# Patient Record
(demographics unavailable — no encounter records)

---

## 2025-02-22 NOTE — REASON FOR VISIT
[FreeTextEntry1] : ======================================================================================= Diagnostic Tests: -------------------------------------------------------------- EC24: sinus bradycardia at 58 bpm, otherwise normal.  23: sinus bradycardia at 52 bpm, APCs.  22: sinus bradycardia at 57 bpm, possible old anterior MI.  -------------------------------------------------------------- Stress tests: 10/25/22: exercise stress echo: EF 63%, normal wall motion, PA pressures, and ECG post 4.6 METs of exercise (nondiagnostic secondary to achieving 67% MPHR).  -------------------------------------------------------------- Lower extremity veins: 23: sono: no DVT bilaterally, + RGSV nonocclusive thrombus, + RGVS and RSFJ reflux, + R anteromedial varicose veins.

## 2025-02-22 NOTE — ASSESSMENT
[FreeTextEntry1] : ======================================================================================= 1. Dyslipidemia:  (12/26/23): she discontinued rosuvastatin 40mg and is currently taking supplements instead:   (06/25/24):  - discussed therapeutic lifestyle changes to promote improved lipid metabolism  - check repeat lipid profile today  2. Pre-DM2: A1c 5.8% (06/25/24):  - discussed with patient therapeutic lifestyle changes to improve glucose metabolism  - check repeat A1c today  3. Chest pain: mixed typical and atypical features: s/p normal sub max stress echo 10/25/22: resolved:   - will manage conservatively at this time  4. Palpitations: likely secondary to ectopy: resolved:   - will order a 2-week MCT monitor ([employer]) if symptoms recur  5. Chronic venous insufficiency: s/p multiple DVTs in the peripartum time periods: + RGSV nonocclusive thrombus, + RGSC and RSFJ reflux:  - will follow with serial lower extremity venous sonograms  - follow up with peripheral vein specialist, Meme Mo MD  - will pursue aggressive prevention of recurrent DVTs around high-risk times.

## 2025-02-22 NOTE — HISTORY OF PRESENT ILLNESS
[FreeTextEntry1] : Ms. Shields presents for follow up and management of dyslipidemia, DVT right lower extremity (multiple, during peripartum time periods), chronic venous insufficiency (RGSV chronic nonocclusive thrombus), and pre-DM2. On 10/25/22, she had an exercise stress echocardiogram which revealed an EF of 63% and normal wall motion, PA pressures, and ECG post 4.6 METs of exercise (nondiagnostic secondary to achieving 67% MPHR).  We had an extensive discussion about therapeutic lifestyle changes to promote increased cardiovascular fitness and achieve goal weight.  On 12/26/23, she had lower extremity venous sonography which revealed no DVT bilaterally, + RGSV nonocclusive thrombus, + RGVS and RSFJ reflux, + R anteromedial varicose veins.  She has been wearing venous compression stockings but requires increased compression. She is not taking rosuvastatin and is instead taking an "herbal supplement" for lipid lowering.